# Patient Record
(demographics unavailable — no encounter records)

---

## 2024-10-11 NOTE — PHYSICAL EXAM
[No Acute Distress] : no acute distress [No Respiratory Distress] : no respiratory distress  [Normal Rate] : normal rate  [Normal] : soft, non-tender, non-distended, no masses palpated, no HSM and normal bowel sounds [External Female Genitalia] : normal external genitalia [Vagina] : normal vaginal exam [Cervix] : normal cervix [Uterine Adnexae] : normal adnexa [Anus Abnormality] : the anus and perineum were normal

## 2024-10-13 NOTE — HISTORY OF PRESENT ILLNESS
[FreeTextEntry1] : abnormal pap test [de-identified] : 45F with hx obesity, HTN presents for repeat pap. Pap smear done 2/2023 wnl however HPV positive. Denies new sexual partners. LMP 9/27/24.

## 2024-10-13 NOTE — HISTORY OF PRESENT ILLNESS
[FreeTextEntry1] : abnormal pap test [de-identified] : 45F with hx obesity, HTN presents for repeat pap. Pap smear done 2/2023 wnl however HPV positive. Denies new sexual partners. LMP 9/27/24.

## 2024-10-13 NOTE — COUNSELING
[Use proper foot wear] : Use proper foot wear [Sleep ___ hours/day] : Sleep [unfilled] hours/day [Engage in a relaxing activity] : Engage in a relaxing activity [Potential consequences of obesity discussed] : Potential consequences of obesity discussed [Benefits of weight loss discussed] : Benefits of weight loss discussed [Structured Weight Management Program suggested:] : Structured weight management program suggested [Encouraged to maintain food diary] : Encouraged to maintain food diary [Encouraged to increase physical activity] : Encouraged to increase physical activity [Encouraged to use exercise tracking device] : Encouraged to use exercise tracking device [FreeTextEntry1] : plant based diet

## 2024-11-19 NOTE — REVIEW OF SYSTEMS
[Fever] : fever [Chills] : chills [Fatigue] : fatigue [Hot Flashes] : hot flashes [Earache] : earache [Negative] : Heme/Lymph

## 2024-11-26 NOTE — PHYSICAL EXAM
[No Acute Distress] : no acute distress [Well-Appearing] : well-appearing [Normal Oropharynx] : the oropharynx was normal [Supple] : supple [No Respiratory Distress] : no respiratory distress  [No Accessory Muscle Use] : no accessory muscle use [Clear to Auscultation] : lungs were clear to auscultation bilaterally [Normal Rate] : normal rate  [Regular Rhythm] : with a regular rhythm [Normal S1, S2] : normal S1 and S2 [Soft] : abdomen soft [Non Tender] : non-tender [Non-distended] : non-distended [No Focal Deficits] : no focal deficits [Normal Gait] : normal gait [Normal Affect] : the affect was normal [Alert and Oriented x3] : oriented to person, place, and time

## 2024-11-29 NOTE — HISTORY OF PRESENT ILLNESS
[de-identified] : 45 F from [facility] with PMHx obesity, HTN presents to clinic for aphonia. Pt went to work today at [employer] and was sent home. She came to clinic 11/19 and covid/flu swab was negative. Pt said she lost her voice 3 days ago. Endorses headaches, but denies fever, cough, sore throat, chills, joint pain that were present last week.

## 2024-11-29 NOTE — HISTORY OF PRESENT ILLNESS
[de-identified] : 45 F from [facility] with PMHx obesity, HTN presents to clinic for aphonia. Pt went to work today at [employer] and was sent home. She came to clinic 11/19 and covid/flu swab was negative. Pt said she lost her voice 3 days ago. Endorses headaches, but denies fever, cough, sore throat, chills, joint pain that were present last week.

## 2024-11-29 NOTE — HISTORY OF PRESENT ILLNESS
[de-identified] : 45 F from [facility] with PMHx obesity, HTN presents to clinic for aphonia. Pt went to work today at [employer] and was sent home. She came to clinic 11/19 and covid/flu swab was negative. Pt said she lost her voice 3 days ago. Endorses headaches, but denies fever, cough, sore throat, chills, joint pain that were present last week.

## 2024-11-29 NOTE — ASSESSMENT
[FreeTextEntry1] : Case discussed with Dr. Riki SOMMERS in 2 weeks pt had positive quantiferon in 2018, negative CXR, but was never treated with rifampin.

## 2024-11-30 NOTE — ASSESSMENT
[FreeTextEntry1] : Case discussed with Dr. Guadarrama  f/lou in 2 weeks for repeat CXR (+quantiferon in 2018, not treated)

## 2024-11-30 NOTE — HISTORY OF PRESENT ILLNESS
[FreeTextEntry8] : 45 F from [facility] with PMHx obesity, HTN presents to clinic for viral symptoms. Endorses subjective fever, body aches, chills, cough, joint pain, since Sunday. Pt says everyone at home (, sick, nephew) has gotten sick. Pt took 2 tylenol this AM. Of note, pt had positive quantiferon in 2018 and negative CXR. Patient said she never took rifampin.

## 2024-11-30 NOTE — HISTORY OF PRESENT ILLNESS
[FreeTextEntry8] : 45 F from [facility] with PMHx obesity, HTN presents to clinic for viral symptoms. Endorses subjective fever, body aches, chills, cough, joint pain, since Sunday. Pt says everyone at home (, sick, nephew) has gotten sick. Pt took 2 tylenol this AM. Of note, pt had positive quantiferon in 2018 and negative CXR. Patient said she never took rifampin.

## 2025-02-12 NOTE — PHYSICAL EXAM
[Coordination Grossly Intact] : coordination grossly intact [No Focal Deficits] : no focal deficits [Normal Gait] : normal gait [Normal] : no joint swelling and grossly normal strength and tone

## 2025-02-13 NOTE — HISTORY OF PRESENT ILLNESS
[de-identified] : 46F with history of HTN, obesity, presents for evaluation of some irregular length of periods and right foot pain  LMP 1/17/24, starting to become irregular.

## 2025-02-13 NOTE — ASSESSMENT
[FreeTextEntry1] : Advised premenopausal syndrome likely in differential Fup TSH Monitor symptoms and periods Discussed foot wear, leg elevation and foot exercises Advised compression stockings at work Discussed lifestyle changes and weight loss Home PT exercises (plantar fasciitis)     abdullahi Todd

## 2025-02-13 NOTE — HISTORY OF PRESENT ILLNESS
[de-identified] : 46F with history of HTN, obesity, presents for evaluation of some irregular length of periods and right foot pain  LMP 1/17/24, starting to become irregular.

## 2025-03-03 NOTE — HISTORY OF PRESENT ILLNESS
[FreeTextEntry8] : Patient is a 47yo F with PMHx of HTN, HLD, pre-DM, obesity presenting for concern of L knee pain.  10 days of L knee discomfort, worsened with walking/stairs, denies injury/trauma, states it has started making noise. Has not taken anything for this.

## 2025-03-03 NOTE — INTERPRETER SERVICES
[Language Line ] : provided by Language [employer]   [Interpreters_IDNumber] : [id number] [TWNoteComboBox1] : [place]guillan

## 2025-03-03 NOTE — INTERPRETER SERVICES
[Language Line ] : provided by [employer]   [Interpreters_IDNumber] : [id number] [TWNoteComboBox1] : Thai

## 2025-03-03 NOTE — PHYSICAL EXAM
[No Acute Distress] : no acute distress [Well Nourished] : well nourished [Normal Affect] : the affect was normal [Normal Insight/Judgement] : insight and judgment were intact [de-identified] : L knee: crepitus w/ knee flexion, no joint line tenderness, neg patellar grind, mcmurrays, anterior/posterior/lachmans; no effusion, discomfort with patellar manipulation, strength 5/5, full ROM

## 2025-03-03 NOTE — INTERPRETER SERVICES
[Language Line ] : provided by [employer]   [Interpreters_IDNumber] : [id number] [TWNoteComboBox1] : [place]ian

## 2025-03-03 NOTE — PHYSICAL EXAM
[No Acute Distress] : no acute distress [Well Nourished] : well nourished [Normal Affect] : the affect was normal [Normal Insight/Judgement] : insight and judgment were intact [de-identified] : L knee: crepitus w/ knee flexion, no joint line tenderness, neg patellar grind, mcmurrays, anterior/posterior/lachmans; no effusion, discomfort with patellar manipulation, strength 5/5, full ROM

## 2025-03-03 NOTE — HISTORY OF PRESENT ILLNESS
[FreeTextEntry8] : Patient is a 45yo F with PMHx of HTN, HLD, pre-DM, obesity presenting for concern of L knee pain.  10 days of L knee discomfort, worsened with walking/stairs, denies injury/trauma, states it has started making noise. Has not taken anything for this.

## 2025-03-03 NOTE — PHYSICAL EXAM
[No Acute Distress] : no acute distress [Well Nourished] : well nourished [Normal Affect] : the affect was normal [Normal Insight/Judgement] : insight and judgment were intact [de-identified] : L knee: crepitus w/ knee flexion, no joint line tenderness, neg patellar grind, mcmurrays, anterior/posterior/lachmans; no effusion, discomfort with patellar manipulation, strength 5/5, full ROM